# Patient Record
Sex: FEMALE | Race: WHITE | NOT HISPANIC OR LATINO | Employment: OTHER | URBAN - METROPOLITAN AREA
[De-identification: names, ages, dates, MRNs, and addresses within clinical notes are randomized per-mention and may not be internally consistent; named-entity substitution may affect disease eponyms.]

---

## 2024-01-23 ENCOUNTER — LAB (OUTPATIENT)
Dept: LAB | Facility: LAB | Age: 59
End: 2024-01-23
Payer: COMMERCIAL

## 2024-01-23 ENCOUNTER — TELEPHONE (OUTPATIENT)
Dept: OBSTETRICS AND GYNECOLOGY | Facility: CLINIC | Age: 59
End: 2024-01-23
Payer: COMMERCIAL

## 2024-01-23 DIAGNOSIS — R30.0 DYSURIA: ICD-10-CM

## 2024-01-23 DIAGNOSIS — N30.90 CYSTITIS: Primary | ICD-10-CM

## 2024-01-23 PROCEDURE — 87086 URINE CULTURE/COLONY COUNT: CPT

## 2024-01-23 RX ORDER — SULFAMETHOXAZOLE AND TRIMETHOPRIM 800; 160 MG/1; MG/1
1 TABLET ORAL 2 TIMES DAILY
Qty: 10 TABLET | Refills: 0 | Status: SHIPPED | OUTPATIENT
Start: 2024-01-23 | End: 2024-01-28

## 2024-01-23 NOTE — TELEPHONE ENCOUNTER
Patient called stating that she knows she has a UTI because she gets them often.  She is c/o Burning when urinating and frequency.  No blood is present.  She wants you to call something in. She says she will drop off a urine if you want.  Please advise

## 2024-01-23 NOTE — TELEPHONE ENCOUNTER
Reports burning with urination, will go to outpatient lab to provide urine for culture.  Allergies and pharmacy verified. Order placed for urine culture

## 2024-01-24 LAB — BACTERIA UR CULT: NO GROWTH

## 2024-01-25 ENCOUNTER — TELEPHONE (OUTPATIENT)
Dept: OBSTETRICS AND GYNECOLOGY | Facility: CLINIC | Age: 59
End: 2024-01-25
Payer: COMMERCIAL

## 2024-01-25 NOTE — TELEPHONE ENCOUNTER
----- Message from Sonia Lewis MD sent at 1/25/2024  8:52 AM EST -----  Urine culture returned negative. Please confirm she is feeling better.

## 2024-01-26 NOTE — TELEPHONE ENCOUNTER
Patient returned your call.  I did tell her that her Urine Culture was negative and she is feeling better.  She wants to know if she should STOP her antibiotic.  Please advise.

## 2024-02-12 ENCOUNTER — TELEPHONE (OUTPATIENT)
Dept: OBSTETRICS AND GYNECOLOGY | Facility: CLINIC | Age: 59
End: 2024-02-12
Payer: COMMERCIAL

## 2024-02-12 NOTE — TELEPHONE ENCOUNTER
Patient with return of uti symptoms with urgency frequency and hematuria,  will finish course of antibiotics, if symptoms persist will call for appointment for evaluation.

## 2024-02-12 NOTE — TELEPHONE ENCOUNTER
Patient thought she had a UTI so she was prescribed antibiotics. She took them for 2 days (4 pills in total). Patient states she was told that her urine was clean so she stopped taking the medication, and then this morning she went to the restroom and noticed that there was blood in her urine. Would like to know what she should do. Please advise.

## 2024-02-14 ENCOUNTER — TELEPHONE (OUTPATIENT)
Dept: OBSTETRICS AND GYNECOLOGY | Facility: CLINIC | Age: 59
End: 2024-02-14

## 2024-02-14 ENCOUNTER — CLINICAL SUPPORT (OUTPATIENT)
Dept: OBSTETRICS AND GYNECOLOGY | Facility: CLINIC | Age: 59
End: 2024-02-14
Payer: COMMERCIAL

## 2024-02-14 DIAGNOSIS — R30.0 DYSURIA: ICD-10-CM

## 2024-02-14 DIAGNOSIS — R30.0 DYSURIA: Primary | ICD-10-CM

## 2024-02-14 PROCEDURE — 87086 URINE CULTURE/COLONY COUNT: CPT

## 2024-02-14 RX ORDER — CIPROFLOXACIN 500 MG/1
500 TABLET ORAL 2 TIMES DAILY
Qty: 10 TABLET | Refills: 0 | Status: SHIPPED | OUTPATIENT
Start: 2024-02-14 | End: 2024-02-19

## 2024-02-14 NOTE — TELEPHONE ENCOUNTER
Patient called stating that she called on 02/12/2024 regarding she started having pain and burning and urgency.  Also, it looked like a lot of blood to her.  She claims you told her to finish the antibiotic from January that she had left and also call 2 days later if she still has symptoms   She is saying that she does and she went and bought an AZO test (at home uti test) and it came back very Positive.  The blood has stopped.  She also took her temperatures and they have been low.  Please advise

## 2024-02-15 LAB — BACTERIA UR CULT: NORMAL

## 2024-02-16 ENCOUNTER — TELEPHONE (OUTPATIENT)
Dept: OBSTETRICS AND GYNECOLOGY | Facility: CLINIC | Age: 59
End: 2024-02-16
Payer: COMMERCIAL

## 2024-02-16 NOTE — TELEPHONE ENCOUNTER
Camila is confused regarding her two normal urine cultures despite symptoms and positive home test strips for leucocytes.  She is currently without symptoms and plans to finish most recent antibiotic that was prescribed

## 2024-02-19 NOTE — TELEPHONE ENCOUNTER
Discussed with Camila, she is on her last day of antibiotic and feels all symptoms have completely resolved.  She is going to [place] in May for her anniversary and she is nervous this may happen while she is out of the country.  What should she do if this were to happen?

## 2024-08-12 PROBLEM — Z01.419 WELL WOMAN EXAM WITH ROUTINE GYNECOLOGICAL EXAM: Status: ACTIVE | Noted: 2024-08-12

## 2024-08-12 PROBLEM — N95.2 ATROPHIC VAGINITIS: Status: ACTIVE | Noted: 2024-08-12

## 2024-08-12 NOTE — PROGRESS NOTES
Subjective   Patient ID: Camila Pisano is a 58 y.o. female who presents for Annual Exam (- patient declined a chaperone- ).  2022 pap and HPV returned negative.   2024 DEXA returned with normal bone density with lowest T score -0.5 in hip.  She has been using vaginal estradiol for vaginal atrophy.          Review of Systems   Constitutional:  Negative for activity change.   HENT:  Negative for congestion.    Respiratory:  Negative for apnea and cough.    Cardiovascular:  Negative for chest pain.   Gastrointestinal:  Negative for constipation and diarrhea.   Genitourinary:  Negative for hematuria and vaginal pain.   Musculoskeletal:  Negative for joint swelling.   Neurological:  Negative for dizziness.   Psychiatric/Behavioral:  Negative for agitation.        History reviewed. No pertinent past medical history.   Past Surgical History:   Procedure Laterality Date     SECTION, LOW TRANSVERSE  1993     SECTION, LOW TRANSVERSE  1996    NASAL POLYP SURGERY      WISDOM TOOTH EXTRACTION        No Known Allergies   Current Outpatient Medications on File Prior to Visit   Medication Sig Dispense Refill    [DISCONTINUED] estradiol (Vagifem) 10 mcg tablet vaginal tablet Insert 1 tablet (10 mcg) into the vagina 2 times a week.      levothyroxine (Synthroid, Levoxyl) 50 mcg tablet Take 1 tablet (50 mcg) by mouth once daily.      rosuvastatin (Crestor) 5 mg tablet Take 1 tablet (5 mg) by mouth once daily.       No current facility-administered medications on file prior to visit.        Objective   Physical Exam  Constitutional:       Appearance: Normal appearance.   Neck:      Thyroid: No thyromegaly.   Cardiovascular:      Rate and Rhythm: Normal rate and regular rhythm.      Heart sounds: Normal heart sounds.   Pulmonary:      Effort: Pulmonary effort is normal.      Breath sounds: Normal breath sounds.   Chest:      Chest wall: No mass.   Breasts:     Right: Normal. No inverted nipple,  mass, nipple discharge or skin change.      Left: Normal. No inverted nipple, mass, nipple discharge or skin change.   Abdominal:      General: There is no distension.      Palpations: Abdomen is soft. There is no mass.      Tenderness: There is no abdominal tenderness.   Genitourinary:     General: Normal vulva.      Exam position: Lithotomy position.      Labia:         Right: No rash.         Left: No rash.       Urethra: No urethral lesion.      Vagina: Normal. No lesions.      Cervix: No friability or lesion.      Uterus: Normal. Not enlarged and not tender.       Adnexa: Right adnexa normal and left adnexa normal.        Right: No mass or tenderness.          Left: No mass or tenderness.     Musculoskeletal:         General: No deformity.      Cervical back: Neck supple.   Lymphadenopathy:      Cervical: No cervical adenopathy.   Skin:     General: Skin is warm and dry.      Findings: No rash.   Neurological:      General: No focal deficit present.      Mental Status: She is alert.   Psychiatric:         Mood and Affect: Mood normal.         Behavior: Behavior is cooperative.         Thought Content: Thought content normal.           Problem List Items Addressed This Visit       Well woman exam with routine gynecological exam - Primary    Overview     6/28/2022 pap and HPV returned negative.   1/2/2024 DEXA returned with normal bone density with lowest T score -0.5 in hip.          Current Assessment & Plan     Pap is not yet indicated.  Mammogram is ordered for October.  Regular exercise and attaining/maintaining a healthy weight is encouraged.   Adequate calcium intake with diet or supplements is encouraged.    We will notify of any abnormal results.          Atrophic vaginitis    Overview     She is doing well with vaginal estradiol tablets.          Relevant Medications    estradiol (Vagifem) 10 mcg tablet vaginal tablet     Other Visit Diagnoses       Encounter for screening mammogram for malignant  neoplasm of breast        Relevant Orders    BI mammo bilateral screening tomosynthesis

## 2024-08-12 NOTE — ASSESSMENT & PLAN NOTE
Pap is not yet indicated.  Mammogram is ordered for October.  Regular exercise and attaining/maintaining a healthy weight is encouraged.   Adequate calcium intake with diet or supplements is encouraged.    We will notify of any abnormal results.

## 2024-08-15 ENCOUNTER — APPOINTMENT (OUTPATIENT)
Dept: OBSTETRICS AND GYNECOLOGY | Facility: CLINIC | Age: 59
End: 2024-08-15
Payer: COMMERCIAL

## 2024-08-15 VITALS — BODY MASS INDEX: 24.39 KG/M2 | DIASTOLIC BLOOD PRESSURE: 70 MMHG | WEIGHT: 142 LBS | SYSTOLIC BLOOD PRESSURE: 110 MMHG

## 2024-08-15 DIAGNOSIS — Z01.419 WELL WOMAN EXAM WITH ROUTINE GYNECOLOGICAL EXAM: Primary | ICD-10-CM

## 2024-08-15 DIAGNOSIS — Z12.31 ENCOUNTER FOR SCREENING MAMMOGRAM FOR MALIGNANT NEOPLASM OF BREAST: ICD-10-CM

## 2024-08-15 DIAGNOSIS — N95.2 ATROPHIC VAGINITIS: ICD-10-CM

## 2024-08-15 PROCEDURE — 1036F TOBACCO NON-USER: CPT | Performed by: OBSTETRICS & GYNECOLOGY

## 2024-08-15 PROCEDURE — 99396 PREV VISIT EST AGE 40-64: CPT | Performed by: OBSTETRICS & GYNECOLOGY

## 2024-08-15 RX ORDER — ESTRADIOL 10 UG/1
10 INSERT VAGINAL 2 TIMES WEEKLY
COMMUNITY
Start: 2022-06-28 | End: 2024-08-15 | Stop reason: SDUPTHER

## 2024-08-15 RX ORDER — LEVOTHYROXINE SODIUM 50 UG/1
50 TABLET ORAL DAILY
COMMUNITY

## 2024-08-15 RX ORDER — ESTRADIOL 10 UG/1
10 INSERT VAGINAL 2 TIMES WEEKLY
Qty: 24 TABLET | Refills: 3 | Status: SHIPPED | OUTPATIENT
Start: 2024-08-15

## 2024-08-15 RX ORDER — ROSUVASTATIN CALCIUM 5 MG/1
5 TABLET, COATED ORAL DAILY
COMMUNITY

## 2024-08-15 ASSESSMENT — ENCOUNTER SYMPTOMS
DIARRHEA: 0
APNEA: 0
ACTIVITY CHANGE: 0
COUGH: 0
HEMATURIA: 0
AGITATION: 0
JOINT SWELLING: 0
DIZZINESS: 0
CONSTIPATION: 0

## 2024-08-29 ENCOUNTER — APPOINTMENT (OUTPATIENT)
Dept: RADIOLOGY | Facility: CLINIC | Age: 59
End: 2024-08-29
Payer: COMMERCIAL

## 2024-10-24 ENCOUNTER — HOSPITAL ENCOUNTER (OUTPATIENT)
Dept: RADIOLOGY | Facility: CLINIC | Age: 59
Discharge: HOME | End: 2024-10-24
Payer: COMMERCIAL

## 2024-10-24 VITALS — BODY MASS INDEX: 24.24 KG/M2 | HEIGHT: 64 IN | WEIGHT: 142 LBS

## 2024-10-24 DIAGNOSIS — Z12.31 ENCOUNTER FOR SCREENING MAMMOGRAM FOR MALIGNANT NEOPLASM OF BREAST: ICD-10-CM

## 2024-10-24 PROCEDURE — 77067 SCR MAMMO BI INCL CAD: CPT

## 2024-11-01 ENCOUNTER — HOSPITAL ENCOUNTER (OUTPATIENT)
Dept: RADIOLOGY | Facility: EXTERNAL LOCATION | Age: 59
Discharge: HOME | End: 2024-11-01

## 2024-11-05 ENCOUNTER — TELEPHONE (OUTPATIENT)
Dept: OBSTETRICS AND GYNECOLOGY | Facility: CLINIC | Age: 59
End: 2024-11-05
Payer: COMMERCIAL

## 2024-11-05 DIAGNOSIS — R92.8 ABNORMAL MAMMOGRAM OF LEFT BREAST: Primary | ICD-10-CM

## 2024-11-05 NOTE — TELEPHONE ENCOUNTER
Patient called stating that on her my chart she was told to schedule asap further testing on the left breast.  Can you put in an order so she can schedule?

## 2024-11-21 ENCOUNTER — HOSPITAL ENCOUNTER (OUTPATIENT)
Dept: RADIOLOGY | Facility: HOSPITAL | Age: 59
Discharge: HOME | End: 2024-11-21
Payer: COMMERCIAL

## 2024-11-21 DIAGNOSIS — R92.8 ABNORMAL MAMMOGRAM OF LEFT BREAST: ICD-10-CM

## 2024-11-21 DIAGNOSIS — R92.8 ABNORMAL MAMMOGRAM OF LEFT BREAST: Primary | ICD-10-CM

## 2024-11-21 PROCEDURE — 77065 DX MAMMO INCL CAD UNI: CPT | Mod: LT

## 2024-11-21 PROCEDURE — 77065 DX MAMMO INCL CAD UNI: CPT | Mod: LEFT SIDE | Performed by: RADIOLOGY

## 2024-12-02 ENCOUNTER — TELEPHONE (OUTPATIENT)
Dept: OBSTETRICS AND GYNECOLOGY | Facility: CLINIC | Age: 59
End: 2024-12-02
Payer: COMMERCIAL

## 2025-05-22 ENCOUNTER — HOSPITAL ENCOUNTER (OUTPATIENT)
Dept: RADIOLOGY | Facility: HOSPITAL | Age: 60
Discharge: HOME | End: 2025-05-22
Payer: COMMERCIAL

## 2025-05-22 VITALS — BODY MASS INDEX: 24.37 KG/M2 | HEIGHT: 64 IN

## 2025-05-22 DIAGNOSIS — R92.8 ABNORMAL MAMMOGRAM OF LEFT BREAST: ICD-10-CM

## 2025-05-22 PROCEDURE — 77061 BREAST TOMOSYNTHESIS UNI: CPT | Mod: LT

## 2025-05-22 NOTE — NURSING NOTE
After patient reviewed diagnostic results with Dr. Clark, referred to this RN Breast Care Coordinator for follow up scheduling and education review. Literature regarding abnormal breast imaging and breast biopsy including what to expect before, during, and after the procedure reviewed with the patient. Questions answered to patient satisfaction. Patient selected Dr. Lopez for surgical consultation 5/27 10:00AM with biopsy to follow 5/28 2:15PM. Information provided and reviewed to include provider information and how to reach me directly with questions or concerns before concluding visit.

## 2025-05-27 ENCOUNTER — APPOINTMENT (OUTPATIENT)
Facility: CLINIC | Age: 60
End: 2025-05-27
Payer: COMMERCIAL

## 2025-05-27 VITALS
SYSTOLIC BLOOD PRESSURE: 100 MMHG | OXYGEN SATURATION: 99 % | WEIGHT: 143.2 LBS | DIASTOLIC BLOOD PRESSURE: 68 MMHG | HEART RATE: 71 BPM | BODY MASS INDEX: 24.45 KG/M2 | HEIGHT: 64 IN

## 2025-05-27 DIAGNOSIS — R92.30 DENSE BREAST TISSUE: ICD-10-CM

## 2025-05-27 DIAGNOSIS — R92.1 CALCIFICATION OF LEFT BREAST: Primary | ICD-10-CM

## 2025-05-27 PROCEDURE — 3008F BODY MASS INDEX DOCD: CPT | Performed by: SURGERY

## 2025-05-27 PROCEDURE — 99203 OFFICE O/P NEW LOW 30 MIN: CPT | Performed by: SURGERY

## 2025-05-27 PROCEDURE — 1036F TOBACCO NON-USER: CPT | Performed by: SURGERY

## 2025-05-27 ASSESSMENT — ENCOUNTER SYMPTOMS
NAUSEA: 0
BRUISES/BLEEDS EASILY: 0
CONSTIPATION: 0
EYE PAIN: 0
DIARRHEA: 0
CONFUSION: 0
ABDOMINAL PAIN: 0
SPEECH DIFFICULTY: 0
DYSURIA: 0
AGITATION: 0
FATIGUE: 0
HEADACHES: 0
FREQUENCY: 0
FEVER: 0
CHILLS: 0
HEMATURIA: 0
VOMITING: 0
COUGH: 0
MYALGIAS: 0
POLYPHAGIA: 0
WOUND: 0
WEAKNESS: 0
SHORTNESS OF BREATH: 0
EYE REDNESS: 0
ARTHRALGIAS: 0
FLANK PAIN: 0
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
## 2025-05-27 NOTE — PATIENT INSTRUCTIONS
1.  Keep your appointment for your mammogram-guided biopsy of your left breast calcifications on Wednesday, 5/28/2025.  This biopsy is performed in the radiology department of [facility].  2.  If you have any problems with the biopsy site (bleeding or concern for infection), please contact Dr. Lopez's office.  [phone]  3.  Follow-up in Dr. Lopez's office on Wednesday, 6/4/2025 to review your biopsy results.
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
## 2025-05-28 ENCOUNTER — HOSPITAL ENCOUNTER (OUTPATIENT)
Dept: RADIOLOGY | Facility: HOSPITAL | Age: 60
Discharge: HOME | End: 2025-05-28
Payer: COMMERCIAL

## 2025-05-28 VITALS
HEART RATE: 76 BPM | OXYGEN SATURATION: 98 % | DIASTOLIC BLOOD PRESSURE: 71 MMHG | RESPIRATION RATE: 16 BRPM | SYSTOLIC BLOOD PRESSURE: 129 MMHG

## 2025-05-28 DIAGNOSIS — R92.8 ABNORMAL FINDINGS ON DIAGNOSTIC IMAGING OF BREAST: ICD-10-CM

## 2025-05-28 DIAGNOSIS — R92.1 BREAST CALCIFICATIONS ON MAMMOGRAM: ICD-10-CM

## 2025-05-28 PROCEDURE — 2780000003 HC OR 278 NO HCPCS

## 2025-05-28 PROCEDURE — 2720000007 HC OR 272 NO HCPCS

## 2025-05-28 PROCEDURE — C1739 HC OR 278 NO HCPCS: HCPCS

## 2025-05-28 PROCEDURE — 19081 BX BREAST 1ST LESION STRTCTC: CPT | Mod: LEFT SIDE | Performed by: RADIOLOGY

## 2025-05-28 PROCEDURE — 19081 BX BREAST 1ST LESION STRTCTC: CPT | Mod: LT

## 2025-05-28 PROCEDURE — 77065 DX MAMMO INCL CAD UNI: CPT | Mod: LEFT SIDE | Performed by: RADIOLOGY

## 2025-05-28 ASSESSMENT — PAIN SCALES - GENERAL: PAINLEVEL_OUTOF10: 0 - NO PAIN

## 2025-05-28 ASSESSMENT — PAIN - FUNCTIONAL ASSESSMENT: PAIN_FUNCTIONAL_ASSESSMENT: 0-10

## 2025-05-28 NOTE — NURSING NOTE
Patient reported anxiousness prior to biopsy. Interventions including therapeutic nursing support and distraction provided throughout procedure with good response. Patient completed procedure without difficulty. Hemostasis achieved at left breast puncture site with manual pressure. Bandage applied. Patient verbalized understanding discharge instructions and has written copy for review. Assisted by technician for post-procedure clip imaging. Ambulates independently without difficulty to Mammography changing area.

## 2025-06-02 LAB
LABORATORY COMMENT REPORT: NORMAL
PATH REPORT.FINAL DX SPEC: NORMAL
PATH REPORT.GROSS SPEC: NORMAL
PATH REPORT.RELEVANT HX SPEC: NORMAL
PATH REPORT.TOTAL CANCER: NORMAL

## 2025-06-03 ENCOUNTER — APPOINTMENT (OUTPATIENT)
Facility: CLINIC | Age: 60
End: 2025-06-03
Payer: COMMERCIAL

## 2025-06-03 VITALS
OXYGEN SATURATION: 99 % | DIASTOLIC BLOOD PRESSURE: 82 MMHG | HEART RATE: 62 BPM | SYSTOLIC BLOOD PRESSURE: 120 MMHG | HEIGHT: 64 IN | BODY MASS INDEX: 24.45 KG/M2 | WEIGHT: 143.2 LBS

## 2025-06-03 DIAGNOSIS — R92.1 CALCIFICATION OF LEFT BREAST: Primary | ICD-10-CM

## 2025-06-03 DIAGNOSIS — R92.30 DENSE BREAST TISSUE: ICD-10-CM

## 2025-06-03 PROCEDURE — 3008F BODY MASS INDEX DOCD: CPT | Performed by: SURGERY

## 2025-06-03 PROCEDURE — 99213 OFFICE O/P EST LOW 20 MIN: CPT | Performed by: SURGERY

## 2025-06-03 ASSESSMENT — ENCOUNTER SYMPTOMS
CHILLS: 0
CONFUSION: 0
BRUISES/BLEEDS EASILY: 0
DYSURIA: 0
FREQUENCY: 0
FLANK PAIN: 0
FATIGUE: 0
SHORTNESS OF BREATH: 0
ARTHRALGIAS: 0
WEAKNESS: 0
MYALGIAS: 0
FEVER: 0
DIARRHEA: 0
HEMATURIA: 0
COUGH: 0
ABDOMINAL PAIN: 0
HEADACHES: 0
VOMITING: 0
SPEECH DIFFICULTY: 0
EYE REDNESS: 0
AGITATION: 0
CONSTIPATION: 0
EYE PAIN: 0
WOUND: 0
NAUSEA: 0
POLYPHAGIA: 0

## 2025-06-03 NOTE — PATIENT INSTRUCTIONS
The biopsy of your left breast calcifications does NOT show any evidence of cancer.  However, these calcifications will be monitored every 6 months to look for any ongoing changes.  You will be due for a left diagnostic mammogram in 6 months.  At that same time, you will get a screening mammogram of the right breast. Follow-up in Dr. Lopez's office after this mammogram.  Do your monthly self breast exams.  If you identify any abnormalities, please call Dr. Lopez's office.  [phone]

## 2025-06-03 NOTE — PROGRESS NOTES
GENERAL SURGERY OFFICE NOTE    Patient: Camila Pisano    Age: 59 y.o.   Gender: female    MRN: [id number]    PCP: Lina Calderon MD        SUBJECTIVE     Chief Complaint  Follow-up (Patient is here for a follow up left breast biopsy. Patient states that she did not have any problems during or after her breast biopsy. Patient states that she does have some blue and yellow bruising.)       AC Jensen returns to the office after undergoing a stereotactic biopsy of left breast calcifications. She initially had some bleeding at the time of the biopsy, but this seemed well controlled before she left. Moderate bruising at biopsy site, but no erythema or drainage. No significant pain. She is here for biopsy results.    Risk factors for breast cancer: 59-year-old white female; menarche at age 12; first live birth at age 27; no previous breast biopsy; no family history of breast cancer.  No family history of ovarian/pancreatic/prostate cancer.  She went through menopause around age 53, but never took any hormone replacement therapy.  This gives her a 5-year Dior score of 1.5% and a lifetime risk of 7.2% which puts her in a less than average risk category.      ROS  Review of Systems   Constitutional:  Negative for chills, fatigue and fever.   HENT:  Negative for congestion, ear pain and hearing loss.    Eyes:  Negative for pain and redness.   Respiratory:  Negative for cough and shortness of breath.    Cardiovascular:  Negative for chest pain and leg swelling.   Gastrointestinal:  Negative for abdominal pain, constipation, diarrhea, nausea and vomiting.   Endocrine: Negative for polyphagia.   Genitourinary:  Negative for dysuria, flank pain, frequency and hematuria.   Musculoskeletal:  Negative for arthralgias and myalgias.   Skin:  Negative for rash and wound.   Allergic/Immunologic: Negative for immunocompromised state.   Neurological:  Negative for speech difficulty, weakness and headaches.   Hematological:  Does not  "bruise/bleed easily.   Psychiatric/Behavioral:  Negative for agitation and confusion.           HISTORY     Past Medical History:   Diagnosis Date    High cholesterol     Hypothyroidism         Past Surgical History:   Procedure Laterality Date     SECTION, LOW TRANSVERSE  1993     SECTION, LOW TRANSVERSE  1996    NASAL POLYP SURGERY      WISDOM TOOTH EXTRACTION          Family History   Problem Relation Name Age of Onset    No Known Problems Mother      Hyperlipidemia Father      Hyperlipidemia Sister      Hyperlipidemia Brother      Hyperlipidemia Paternal Grandmother      Hyperlipidemia Paternal Grandfather          No Known Allergies     Social History     Tobacco Use   Smoking Status Never   Smokeless Tobacco Never        Social History     Substance and Sexual Activity   Alcohol Use Yes        HOME MEDICATIONS  Current Outpatient Medications   Medication Instructions    estradiol (VAGIFEM) 10 mcg, vaginal, 2 times weekly    levothyroxine (SYNTHROID, LEVOXYL) 50 mcg, oral, Daily    rosuvastatin (CRESTOR) 5 mg, oral, Daily          OBJECTIVE   Last Recorded Vitals.  Blood pressure 120/82, pulse 62, height 1.626 m (5' 4\"), weight 65 kg (143 lb 3.2 oz), SpO2 99%.     PHYSICAL EXAM  Physical Exam   General: Well-developed, well-nourished and in no acute distress.  Head: Normocephalic. Atraumatic.  Neck/thyroid: Neck is supple.   Eyes: Pupils equal round and reactive to light. Conjunctiva normal.  ENMT: No masses or deformity of external nose. External ears without masses.  Respiratory/Chest:  Normal respiratory effort.  Breast: Moderate sized, symmetrical breast.  Dense breast tissue throughout both breast with some nodularity of the subareolar region of both breasts.  No palpable mass of either breast.  No skin changes and no nipple discharge.  Lymphatics: No palpable lymphadenopathy of the cervical, supraclavicular or axillary regions.  Cardiovascular: Regular rate and rhythm. "   Abdomen: Soft, nontender, nondistended.   Musculoskeletal: Joints and limbs are grossly normal. Normal gait. Normal range of motion of major joints.  Neuro: Oriented to person, place and time. No obvious neurological deficit. Motor strength grossly normal.  Psych: Normal mood and affect.    RESULTS   LABS      RADIOLOGY RESULTS  mammo left diagnostic tomosynthesis  Status: Final result     PACS Images     Show images for BI mammo left diagnostic tomosynthesis  Signed by    Signed Time Phone Pager   Duane Clark MD 5/22/2025 08:53 [phone]     Exam Information    Status Exam Begun Exam Ended   Final 5/22/2025 08:17 5/22/2025 08:42     Study Result    Narrative & Impression   Interpreted By:  Duane Clark,   STUDY:  BI MAMMO LEFT DIAGNOSTIC TOMOSYNTHESIS;  5/22/2025 8:42 am      ACCESSION NUMBER(S):  [id number]      ORDERING CLINICIAN:  LUDA JEFF      INDICATION:  Calcifications left breast      ,R92.8 Other abnormal and inconclusive findings on diagnostic imaging  of breast      COMPARISON:  Multiple prior examinations dating back to 10/01/2021      FINDINGS:  MAMMOGRAPHY: 2D and tomosynthesis images were reviewed at 1 mm slice  thickness.      Density:  The breasts are heterogeneously dense, which may obscure  small masses.      Anterior depth calcifications appear pleomorphic on this examination  which raises suspicion. Stereotactic core biopsy recommended.      IMPRESSION:  Indeterminate calcifications with slight morphologic change raises  suspicion.      BI-RADS CATEGORY:  BI-RADS Category:  4 Suspicious.  Recommendation:  Surgical Consultation and Biopsy.  Recommended Date:  Immediate.  Laterality:  Left.      Stereotactic core biopsy left breast recommended. Surgical  consultation with history and physical required prior to biopsy.       PATHOLOGY  Surgical Pathology Exam: [id number]  Order: [id number]   Collected 5/28/2025 14:19       Status: Final result       Dx: Abnormal  findings on diagnostic imagi...    Test Result Released: Yes (seen)    0 Result Notes       Component  Resulting Agency   FINAL DIAGNOSIS   A. Left breast calcifications, stereotactic guided core biopsy:  -- Sclerosing adenosis and microcysts with associated calcifications.   Electronically signed by Marianna Beverly MD on 6/2/2025 at 1038 EDT      [facility]          The above labs, radiology films and pathology reports were reviewed by myself.   Referring physician notes and other providers notes reviewed.      ASSESSMENT / PLAN   Diagnoses and all orders for this visit:  Calcification of left breast  -     BI mammo bilateral diagnostic tomosynthesis; Future  Dense breast tissue        Plan  May 2025: LEFT: Pleomorphic calcifications of the subareolar region; stereotactic biopsy demonstrated sclerosing adenosis and microcysts with associated calcifications    1.  Reviewed with the patient that she does have calcifications of both breasts which have been present for several years.  The calcifications in the subareolar region of the left breast have changed slightly over the last 6 months and a tissue diagnosis has been recommended. She underwent a stereotactic biopsy with benign results. Will continue to  monitor this area every 6 months. Since she will be due for her right screening mammogram in 6 months as well, will schedule for a bilateral diagnostic mammogram in 6 months with follow up in the office afterwards.  2.  Reviewed that dense breast tissue does not put her at higher risk of developing breast cancer, but does make it harder to detect cancer at an earlier stage.  Therefore, self breast exams are very important.      Nancy Lopez MD, FACS  [facility] General Surgery  [address] [place] [address];   [address]  [place] [id number] [phone]

## 2025-06-04 ENCOUNTER — APPOINTMENT (OUTPATIENT)
Dept: SURGERY | Facility: CLINIC | Age: 60
End: 2025-06-04
Payer: COMMERCIAL

## 2025-09-09 ENCOUNTER — APPOINTMENT (OUTPATIENT)
Dept: OBSTETRICS AND GYNECOLOGY | Facility: CLINIC | Age: 60
End: 2025-09-09
Payer: COMMERCIAL

## 2025-10-16 ENCOUNTER — APPOINTMENT (OUTPATIENT)
Dept: OBSTETRICS AND GYNECOLOGY | Facility: CLINIC | Age: 60
End: 2025-10-16
Payer: COMMERCIAL

## 2025-11-04 ENCOUNTER — APPOINTMENT (OUTPATIENT)
Facility: CLINIC | Age: 60
End: 2025-11-04
Payer: COMMERCIAL